# Patient Record
Sex: MALE | HISPANIC OR LATINO | RURAL
[De-identification: names, ages, dates, MRNs, and addresses within clinical notes are randomized per-mention and may not be internally consistent; named-entity substitution may affect disease eponyms.]

---
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
## 2024-07-17 NOTE — PROCEDURE: PRESCRIPTION MEDICATION MANAGEMENT
Discontinue Regimen: Discontinue dupixent next injection patient may be having allergic reaction to dupixent
Male
Detail Level: Zone
Render In Strict Bullet Format?: No
Initiate Treatment: Dupixent